# Patient Record
Sex: MALE | Race: WHITE
[De-identification: names, ages, dates, MRNs, and addresses within clinical notes are randomized per-mention and may not be internally consistent; named-entity substitution may affect disease eponyms.]

---

## 2018-06-02 ENCOUNTER — HOSPITAL ENCOUNTER (EMERGENCY)
Dept: HOSPITAL 43 - DL.ED | Age: 58
Discharge: HOME | End: 2018-06-02
Payer: COMMERCIAL

## 2018-06-02 DIAGNOSIS — J32.9: Primary | ICD-10-CM

## 2018-06-03 NOTE — EDM.PDOC
Scribed by Latasha Terrell 06/03/18 0911 for Enrique Euceda MD





ED HPI GENERAL MEDICAL PROBLEM





- General


Chief Complaint: ENT Problem


Stated Complaint: SINUS INFECTION


Time Seen by Provider: 06/02/18 10:45


Source of Information: Reports: Patient, RN, RN Notes Reviewed


History Limitations: Reports: No Limitations





- History of Present Illness


INITIAL COMMENTS - FREE TEXT/NARRATIVE: 


c/o "sinus infection". Pt reports sinus pressure with intermittent drainage for 

about 12 weeks, but has felt like a 'sinus infection' for about one week. 

Denies fever or chills. 





Onset: Gradual


Duration: Constant, Getting Worse


Location: Reports: Head, Face


Quality: Reports: Pressure


Severity: Moderate


Improves with: Reports: None


Worsens with: Reports: None


Associated Symptoms: Reports: No Other Symptoms


  ** Head


Pain Score (Numeric/FACES): 5





- Related Data


 Allergies











Allergy/AdvReac Type Severity Reaction Status Date / Time


 


No Known Allergies Allergy   Verified 06/02/18 10:41











Home Meds: 


 Home Meds





Thyroid,Pork [Lanexa Thyroid] 1 tab PO BID 06/02/18 [History]











Past Medical History





- Past Health History


Medical/Surgical History: Denies Medical/Surgical History





Social & Family History





- Family History


Family Medical History: Noncontributory





- Living Situation & Occupation


Living situation: Reports: , with Spouse


Occupation: Employed





ED ROS ENT





- Review of Systems


Review Of Systems: ROS reveals no pertinent complaints other than HPI.





ED EXAM, ENT





- Physical Exam


Exam: See Below


Exam Limited By: No Limitations


General Appearance: Alert, WD/WN, No Apparent Distress


Eye Exam: Bilateral Eye: Normal Inspection


Ears: Normal External Exam, Normal Canal, TM Fluid (clear).  No: Mastoid 

Tenderness, Canal Blood, Canal Discharge, Canal Swelling, TM Bulging, TM 

Dullness, TM Erythema, TM Blood, TM Perforation


Nose: Nasal Discharge (clear and purulent), Nasal Swelling, Injected 

Turbinates.  No: Active Bleeding


Mouth/Throat: Normal Inspection, Normal Gums, Normal Lips, Normal Oropharynx (

with postnasal drip), Normal Teeth


Head: Atraumatic, Normocephalic


Neck: Normal Inspection, Supple, Non-Tender, Full Range of Motion.  No: 

Lymphadenopathy (L), Lymphadenopathy (R)


Respiratory/Chest: No Respiratory Distress, Lungs Clear, Normal Breath Sounds, 

No Accessory Muscle Use, Chest Non-Tender


Cardiovascular: Regular Rate, Rhythm


Neurological: Alert, Oriented, CN II-XII Intact, Normal Cognition, Normal Gait, 

No Motor/Sensory Deficits


Psychiatric: Normal Affect, Normal Mood


Skin: Warm, Dry, Intact, Normal Color, No Rash





Course





- Vital Signs


Last Recorded V/S: 


 Last Vital Signs











Temp  37.1 C   06/02/18 10:38


 


Pulse  99   06/02/18 10:38


 


Resp  16   06/02/18 10:38


 


BP  141/78 H  06/02/18 10:38


 


Pulse Ox  99   06/02/18 10:38














Departure





- Departure


Time of Disposition: 11:02


Disposition: Home, Self-Care 01


Condition: Good


Clinical Impression: 


 Sinusitis








- Discharge Information


Instructions:  Sinusitis, Adult, Easy-to-Read


Referrals: 


Man Childers MD [Primary Care Provider] - 


Forms:  ED Department Discharge


Additional Instructions: 


RX:  Augmentin 875ng.


Over the counter Claritin D-24 hour or Zyrtec-D one tablet daily as needed.


Follow up in clinic if not improving in 5 to 7 days.





I have read and agree with the documentation that has been completed regarding 

this visit. By signing this record, I attest that the documentation was 

completed in my physical presence and is an accurate record of the encounter.